# Patient Record
Sex: FEMALE | HISPANIC OR LATINO | URBAN - NONMETROPOLITAN AREA
[De-identification: names, ages, dates, MRNs, and addresses within clinical notes are randomized per-mention and may not be internally consistent; named-entity substitution may affect disease eponyms.]

---

## 2024-07-02 ENCOUNTER — APPOINTMENT (RX ONLY)
Dept: URBAN - NONMETROPOLITAN AREA CLINIC 9 | Facility: CLINIC | Age: 29
Setting detail: DERMATOLOGY
End: 2024-07-02

## 2024-07-02 DIAGNOSIS — L81.0 POSTINFLAMMATORY HYPERPIGMENTATION: ICD-10-CM

## 2024-07-02 DIAGNOSIS — L21.8 OTHER SEBORRHEIC DERMATITIS: ICD-10-CM

## 2024-07-02 PROCEDURE — ? COUNSELING

## 2024-07-02 PROCEDURE — ? INVENTORY

## 2024-07-02 PROCEDURE — ? PRESCRIPTION

## 2024-07-02 PROCEDURE — ? TREATMENT REGIMEN

## 2024-07-02 PROCEDURE — 99203 OFFICE O/P NEW LOW 30 MIN: CPT

## 2024-07-02 RX ORDER — CLOBETASOL PROPIONATE 0.5 MG/ML
SOLUTION TOPICAL
Qty: 50 | Refills: 1 | Status: ERX | COMMUNITY
Start: 2024-07-02

## 2024-07-02 RX ADMIN — CLOBETASOL PROPIONATE: 0.5 SOLUTION TOPICAL at 00:00

## 2024-07-02 ASSESSMENT — LOCATION SIMPLE DESCRIPTION DERM
LOCATION SIMPLE: RIGHT ANTERIOR NECK
LOCATION SIMPLE: POSTERIOR SCALP
LOCATION SIMPLE: LEFT CHEEK

## 2024-07-02 ASSESSMENT — LOCATION ZONE DERM
LOCATION ZONE: SCALP
LOCATION ZONE: FACE
LOCATION ZONE: NECK

## 2024-07-02 ASSESSMENT — LOCATION DETAILED DESCRIPTION DERM
LOCATION DETAILED: RIGHT OCCIPITAL SCALP
LOCATION DETAILED: RIGHT INFERIOR ANTERIOR NECK
LOCATION DETAILED: LEFT CENTRAL BUCCAL CHEEK
LOCATION DETAILED: LEFT OCCIPITAL SCALP

## 2024-07-02 NOTE — HPI: RASH
Is This A New Presentation, Or A Follow-Up?: Rash
Additional History: Rash started the end of May and lasted about 3 weeks. She went to [facility] a couple times and was prescribed steroid pills and cream, antibiotic pills and cream. Rash didn’t seem to improve with those therapies, but with time seemed to resolve. Now she has pigment changes of skin and would like improvement of that and the overall dryness of her face.
What Type Of Note Output Would You Prefer (Optional)?: Standard Output
Additional History: She was diagnosed with scalp psoriasis years ago. She uses Head and Shoulders Shampoo and Conditioner daily. She’s had Clobetasol Solution in the past which she would use 3 times a week, but is currently out and would like a refill.

## 2024-08-13 ENCOUNTER — APPOINTMENT (RX ONLY)
Dept: URBAN - NONMETROPOLITAN AREA CLINIC 9 | Facility: CLINIC | Age: 29
Setting detail: DERMATOLOGY
End: 2024-08-13

## 2024-08-13 DIAGNOSIS — L81.0 POSTINFLAMMATORY HYPERPIGMENTATION: ICD-10-CM

## 2024-08-13 DIAGNOSIS — L21.8 OTHER SEBORRHEIC DERMATITIS: ICD-10-CM

## 2024-08-13 PROCEDURE — ? COUNSELING

## 2024-08-13 PROCEDURE — 99213 OFFICE O/P EST LOW 20 MIN: CPT

## 2024-08-13 PROCEDURE — ? PRESCRIPTION

## 2024-08-13 PROCEDURE — ? TREATMENT REGIMEN

## 2024-08-13 RX ORDER — CLOBETASOL PROPIONATE 0.5 MG/ML
SOLUTION TOPICAL
Qty: 50 | Refills: 2 | Status: ERX

## 2024-08-13 ASSESSMENT — LOCATION ZONE DERM
LOCATION ZONE: NECK
LOCATION ZONE: FACE
LOCATION ZONE: SCALP

## 2024-08-13 ASSESSMENT — LOCATION SIMPLE DESCRIPTION DERM
LOCATION SIMPLE: RIGHT ANTERIOR NECK
LOCATION SIMPLE: POSTERIOR SCALP
LOCATION SIMPLE: LEFT CHEEK

## 2024-12-06 ENCOUNTER — APPOINTMENT (OUTPATIENT)
Dept: URBAN - NONMETROPOLITAN AREA CLINIC 9 | Facility: CLINIC | Age: 29
Setting detail: DERMATOLOGY
End: 2024-12-06

## 2024-12-06 DIAGNOSIS — L81.0 POSTINFLAMMATORY HYPERPIGMENTATION: ICD-10-CM

## 2024-12-06 DIAGNOSIS — B35.1 TINEA UNGUIUM: ICD-10-CM

## 2024-12-06 DIAGNOSIS — L21.8 OTHER SEBORRHEIC DERMATITIS: ICD-10-CM

## 2024-12-06 PROCEDURE — ? TREATMENT REGIMEN

## 2024-12-06 PROCEDURE — 99213 OFFICE O/P EST LOW 20 MIN: CPT

## 2024-12-06 PROCEDURE — ? INVENTORY

## 2024-12-06 PROCEDURE — ? COUNSELING

## 2024-12-06 PROCEDURE — ? IN-HOUSE DISPENSING PHARMACY

## 2024-12-06 ASSESSMENT — LOCATION SIMPLE DESCRIPTION DERM
LOCATION SIMPLE: POSTERIOR SCALP
LOCATION SIMPLE: RIGHT ANTERIOR NECK
LOCATION SIMPLE: LEFT CHEEK
LOCATION SIMPLE: LEFT GREAT TOE

## 2024-12-06 ASSESSMENT — LOCATION DETAILED DESCRIPTION DERM
LOCATION DETAILED: RIGHT OCCIPITAL SCALP
LOCATION DETAILED: LEFT CENTRAL BUCCAL CHEEK
LOCATION DETAILED: RIGHT INFERIOR ANTERIOR NECK
LOCATION DETAILED: LEFT DORSAL GREAT TOE
LOCATION DETAILED: LEFT OCCIPITAL SCALP

## 2024-12-06 ASSESSMENT — LOCATION ZONE DERM
LOCATION ZONE: NECK
LOCATION ZONE: FACE
LOCATION ZONE: SCALP
LOCATION ZONE: TOE

## 2024-12-06 NOTE — PROCEDURE: IN-HOUSE DISPENSING PHARMACY
Product 29 Price/Unit (In Dollars): 0
Product 31 Application Directions: Apply topically to the affected areas 1-2 times daily as directed.
Product 4 Unit Type: mg
Send Charges To Patient Encounter: No
Product 53 Amount/Unit (Numbers Only): 1
Name Of Product 11: Anti-Fungal Cream\\n(Ciclopirox 3%, Itraconazole 5%, Urea 20%, DMSO 5%)
Product 22 Price/Unit (In Dollars): 50
Product 2 Amount/Unit (Numbers Only): 120
Product 31 Unit Type: grams
Product 51 Application Directions: Brighten the skin and exfoliate. For anti-aging or acne prone skin. Use 2-4 nights a week after cleansing and before serums/creams. Rinse off.
Product 24 Application Directions: Patient is to mix with 14 ounces of Skin MD.\\nApply sparingly topically to the affected areas 5-10 times per week, taper as directed.
Product 51 Unit Type: unit(s)
Name Of Product 41: Alopecia Topical Solution for Men HP\\n(Minoxidil 7%, Finasteride 0.1%)
Name Of Product 32: Actinic Keratosis Gel / Wart Gel\\n(Imiquimod 5%, Niacinamide 2%, Levocetirizine Dihydrochloride 1%)
Render Refills If Set To 0: Yes
Product 11 Application Directions: Apply sparingly topically to the affected areas 1-2 times daily as directed.
Name Of Product 52: Antioxidant C Serum
Product 22 Amount/Unit (Numbers Only): 60
Name Of Product 25: Dermatitis Foam\\n(Clobetasol Propionate 0.05%, Calcipotriene 0.005%)
Name Of Product 1: Acne Gel\\n(Adapalene 0.3%, Benzoyl Peroxide 2.5%, Niacinamide 4%)
Product 41 Application Directions: Apply topically to the affected areas 1 or 2 times daily or as directed.
Product 32 Amount/Unit (Numbers Only): 30
Name Of Product 26: Psoriasis Cream\\n(Calcipotriene 0.005%, Niacinamide 4%)
Product 54 Unit Type: bottle(s)
Product 23 Application Directions: Apply sparingly topically to the affected areas 3 times per week, taper as directed.
Product 12 Amount/Unit (Numbers Only): 15
Product 26 Application Directions: Apply sparingly to all affected areas as directed 1-2 times per day.
Name Of Product 31: Wart Corn and Callus Cream\\n(Cimetidine 10%, Lidocaine 5%, Salicylic Acid 40%)
Name Of Product 51: AHA + BHA Peel Pads
Detail Level: Zone
Name Of Product 24: Dermatitis Topical Solution\\n(Clobetasol Propionate 0.05%, Niacinamide 4%)
Name Of Product 22: Dermatitis Cream\\n(Clobetasol Propionate 0.05%, Niacinamide 4%)
Product 53 Application Directions: Improves the appearance of dark spots, melasma, hyperpigmentation, sun damage. Use daily, morning or evening.
Product 2 Application Directions: Wash affected areas daily or as directed.
Name Of Product 54: Hyaluronic Acid Gel
Name Of Product 3: Rosacea Silicone Gel\\n(Metronidazole 1%, Ivermectin 1%, Potassium Azeloyl Diglycinate 5%, Niacinamide 4%)
Product 22 Application Directions: Apply sparingly topically to the affected areas 1-2 times a day M-F, taper as directed.
Name Of Product 23: Dermatitis Topical Solution\\n(Clobetasol Propionate 0.05%, Niacinamide 4%
Product 54 Application Directions: Oil-free moisturizing gel. Good for plumping skin with wrinkles, crepiness and uneven texture. Use daily, morning or evening.
Product 32 Application Directions: Apply sparingly topically to the affected areas daily as directed.
Name Of Product 12: Anti-Fungal Nail Solution\\n(Fluconazole 4%, Ibuprofen 2%, Itraconazole 1%, Terbinafine HCL 4%)
Product 1 Application Directions: Apply sparingly topically to acne affected zones daily as directed.
Name Of Product 21: Dermatitis Cream\\n(Fluocinolone Acetonide 0.01%, Niacinamide 4%)
Product 52 Application Directions: Brightens the skin adding a healthy glow. Protects from pollution and environmental damage. Use daily, morning or evening.
Product 25 Application Directions: Apply sparingly topically to the affected areas 1-2 times daily, taper as directed.
Product 12 Application Directions: Apply topically to the affected nail(s) and surrounding areas twice daily as directed.
Name Of Product 53: [place]
Name Of Product 2: Acne Body Wash\\n(Salicylic Acid 5%, Sodium Sulfacetamide Monohydrate 10%, Sulfur Precipitated 2.75%)

## 2024-12-06 NOTE — HPI: INFECTION (TINEA)
How Severe Is Your Dermatophytosis?: moderate
Is This A New Presentation, Or A Follow-Up?: Rash
Additional History: Patient had toenail fungus some years ago and was on pills.   She cleared up and thought the left great toenail would grow out normally but it is not and she is concerned that the fungus will spread.

## 2025-03-28 ENCOUNTER — APPOINTMENT (OUTPATIENT)
Dept: URBAN - NONMETROPOLITAN AREA CLINIC 9 | Facility: CLINIC | Age: 30
Setting detail: DERMATOLOGY
End: 2025-03-28

## 2025-03-28 DIAGNOSIS — B35.1 TINEA UNGUIUM: ICD-10-CM

## 2025-03-28 PROCEDURE — ? IN-HOUSE DISPENSING PHARMACY

## 2025-03-28 PROCEDURE — ? INVENTORY

## 2025-03-28 NOTE — PROCEDURE: IN-HOUSE DISPENSING PHARMACY
Product 51 Refills: 0
Detail Level: Zone
Product 10 Unit Type: mg
Product 53 Unit Type: bottle(s)
Send Charges To Patient Encounter: No
Product 22 Price/Unit (In Dollars): 50
Name Of Product 11: Anti-Fungal Cream\\n(Ciclopirox 3%, Itraconazole 5%, Urea 20%, DMSO 5%)
Product 31 Amount/Unit (Numbers Only): 30
Name Of Product 54: Hyaluronic Acid Gel
Product 2 Amount/Unit (Numbers Only): 120
Product 24 Application Directions: Patient is to mix with 14 ounces of Skin MD.\\nApply sparingly topically to the affected areas 5-10 times per week, taper as directed.
Product 24 Unit Type: grams
Render Refills If Set To 0: Yes
Product 22 Amount/Unit (Numbers Only): 60
Product 11 Application Directions: Apply sparingly topically to the affected areas 1-2 times daily as directed.
Name Of Product 25: Dermatitis Foam\\n(Clobetasol Propionate 0.05%, Calcipotriene 0.005%)
Name Of Product 1: Acne Gel\\n(Adapalene 0.3%, Benzoyl Peroxide 2.5%, Niacinamide 4%)
Product 54 Application Directions: Oil-free moisturizing gel. Good for plumping skin with wrinkles, crepiness and uneven texture. Use daily, morning or evening.
Name Of Product 23: Dermatitis Topical Solution\\n(Clobetasol Propionate 0.05%, Niacinamide 4%
Product 52 Application Directions: Brightens the skin adding a healthy glow. Protects from pollution and environmental damage. Use daily, morning or evening.
Product 32 Application Directions: Apply sparingly topically to the affected areas daily as directed.
Name Of Product 26: Psoriasis Cream\\n(Calcipotriene 0.005%, Niacinamide 4%)
Product 23 Application Directions: Apply sparingly topically to the affected areas 3 times per week, taper as directed.
Name Of Product 53: [place]
Product 26 Application Directions: Apply sparingly to all affected areas as directed 1-2 times per day.
Product 12 Amount/Unit (Numbers Only): 15
Name Of Product 24: Dermatitis Topical Solution\\n(Clobetasol Propionate 0.05%, Niacinamide 4%)
Product 53 Application Directions: Improves the appearance of dark spots, melasma, hyperpigmentation, sun damage. Use daily, morning or evening.
Product 53 Amount/Unit (Numbers Only): 1
Name Of Product 22: Dermatitis Cream\\n(Clobetasol Propionate 0.05%, Niacinamide 4%)
Product 51 Application Directions: Brighten the skin and exfoliate. For anti-aging or acne prone skin. Use 2-4 nights a week after cleansing and before serums/creams. Rinse off.
Product 31 Application Directions: Apply topically to the affected areas 1-2 times daily as directed.
Product 2 Application Directions: Wash affected areas daily or as directed.
Product 51 Unit Type: unit(s)
Product 22 Application Directions: Apply sparingly topically to the affected areas 1-2 times a day M-F, taper as directed.
Name Of Product 52: Antioxidant C Serum
Name Of Product 32: Actinic Keratosis Gel / Wart Gel\\n(Imiquimod 5%, Niacinamide 2%, Levocetirizine Dihydrochloride 1%)
Product 41 Application Directions: Apply topically to the affected areas 1 or 2 times daily or as directed.
Name Of Product 3: Rosacea Silicone Gel\\n(Metronidazole 1%, Ivermectin 1%, Potassium Azeloyl Diglycinate 5%, Niacinamide 4%)
Name Of Product 41: Alopecia Topical Solution for Men HP\\n(Minoxidil 7%, Finasteride 0.1%)
Name Of Product 21: Dermatitis Cream\\n(Fluocinolone Acetonide 0.01%, Niacinamide 4%)
Name Of Product 12: Anti-Fungal Nail Solution\\n(Fluconazole 4%, Ibuprofen 2%, Itraconazole 1%, Terbinafine HCL 4%)
Product 25 Application Directions: Apply sparingly topically to the affected areas 1-2 times daily, taper as directed.
Product 1 Application Directions: Apply sparingly topically to acne affected zones daily as directed.
Name Of Product 51: AHA + BHA Peel Pads
Product 12 Application Directions: Apply topically to the affected nail(s) and surrounding areas twice daily as directed.
Name Of Product 31: Wart Corn and Callus Cream\\n(Cimetidine 10%, Lidocaine 5%, Salicylic Acid 40%)
Name Of Product 2: Acne Body Wash\\n(Salicylic Acid 5%, Sodium Sulfacetamide Monohydrate 10%, Sulfur Precipitated 2.75%)

## 2025-04-21 ENCOUNTER — APPOINTMENT (OUTPATIENT)
Dept: URBAN - NONMETROPOLITAN AREA CLINIC 9 | Facility: CLINIC | Age: 30
Setting detail: DERMATOLOGY
End: 2025-04-21

## 2025-04-21 DIAGNOSIS — B35.1 TINEA UNGUIUM: ICD-10-CM

## 2025-04-21 PROCEDURE — ? IN-HOUSE DISPENSING PHARMACY

## 2025-04-21 PROCEDURE — ? INVENTORY

## 2025-04-21 NOTE — PROCEDURE: IN-HOUSE DISPENSING PHARMACY
Product 68 Amount/Unit (Numbers Only): 0
Name Of Product 23: Dermatitis Topical Solution\\n(Clobetasol Propionate 0.05%, Niacinamide 4%
Product 34 Unit Type: mg
Product 41 Amount/Unit (Numbers Only): 60
Product 54 Application Directions: Oil-free moisturizing gel. Good for plumping skin with wrinkles, crepiness and uneven texture. Use daily, morning or evening.
Name Of Product 2: Acne Body Wash\\n(Salicylic Acid 5%, Sodium Sulfacetamide Monohydrate 10%, Sulfur Precipitated 2.75%)
Product 32 Amount/Unit (Numbers Only): 30
Product 54 Unit Type: bottle(s)
Product 21 Price/Unit (In Dollars): 50
Product 52 Amount/Unit (Numbers Only): 1
Product 23 Application Directions: Apply sparingly topically to the affected areas 3 times per week, taper as directed.
Product 2 Application Directions: Wash affected areas daily or as directed.
Name Of Product 31: Wart Corn and Callus Cream\\n(Cimetidine 10%, Lidocaine 5%, Salicylic Acid 40%)
Product 23 Unit Type: grams
Product 12 Amount/Unit (Numbers Only): 15
Name Of Product 24: Dermatitis Topical Solution\\n(Clobetasol Propionate 0.05%, Niacinamide 4%)
Name Of Product 51: AHA + BHA Peel Pads
Render Refills If Set To 0: Yes
Product 26 Application Directions: Apply sparingly to all affected areas as directed 1-2 times per day.
Name Of Product 22: Dermatitis Cream\\n(Clobetasol Propionate 0.05%, Niacinamide 4%)
Product 53 Application Directions: Improves the appearance of dark spots, melasma, hyperpigmentation, sun damage. Use daily, morning or evening.
Name Of Product 1: Acne Gel\\n(Adapalene 0.3%, Benzoyl Peroxide 2.5%, Niacinamide 4%)
Name Of Product 54: Hyaluronic Acid Gel
Product 22 Application Directions: Apply sparingly topically to the affected areas 1-2 times a day M-F, taper as directed.
Product 1 Application Directions: Apply sparingly topically to acne affected zones daily as directed.
Product 41 Application Directions: Apply topically to the affected areas 1 or 2 times daily or as directed.
Product 32 Application Directions: Apply sparingly topically to the affected areas daily as directed.
Name Of Product 12: Anti-Fungal Nail Solution\\n(Fluconazole 4%, Ibuprofen 2%, Itraconazole 1%, Terbinafine HCL 4%)
Name Of Product 21: Dermatitis Cream\\n(Fluocinolone Acetonide 0.01%, Niacinamide 4%)
Product 25 Application Directions: Apply sparingly topically to the affected areas 1-2 times daily, taper as directed.
Product 52 Application Directions: Brightens the skin adding a healthy glow. Protects from pollution and environmental damage. Use daily, morning or evening.
Detail Level: Zone
Send Charges To Patient Encounter: No
Name Of Product 53: [place]
Product 12 Application Directions: Apply topically to the affected nail(s) and surrounding areas twice daily as directed.
Name Of Product 11: Anti-Fungal Cream\\n(Ciclopirox 3%, Itraconazole 5%, Urea 20%, DMSO 5%)
Product 2 Amount/Unit (Numbers Only): 120
Name Of Product 26: Psoriasis Cream\\n(Calcipotriene 0.005%, Niacinamide 4%)
Name Of Product 3: Rosacea Silicone Gel\\n(Metronidazole 1%, Ivermectin 1%, Potassium Azeloyl Diglycinate 5%, Niacinamide 4%)
Product 31 Application Directions: Apply topically to the affected areas 1-2 times daily as directed.
Product 51 Application Directions: Brighten the skin and exfoliate. For anti-aging or acne prone skin. Use 2-4 nights a week after cleansing and before serums/creams. Rinse off.
Product 24 Application Directions: Patient is to mix with 14 ounces of Skin MD.\\nApply sparingly topically to the affected areas 5-10 times per week, taper as directed.
Name Of Product 41: Alopecia Topical Solution for Men HP\\n(Minoxidil 7%, Finasteride 0.1%)
Name Of Product 32: Actinic Keratosis Gel / Wart Gel\\n(Imiquimod 5%, Niacinamide 2%, Levocetirizine Dihydrochloride 1%)
Product 51 Unit Type: unit(s)
Product 3 Application Directions: Apply sparingly topically to the affected areas 1-2 times daily as directed.
Name Of Product 52: Antioxidant C Serum
Name Of Product 25: Dermatitis Foam\\n(Clobetasol Propionate 0.05%, Calcipotriene 0.005%)

## 2025-06-24 ENCOUNTER — APPOINTMENT (OUTPATIENT)
Dept: URBAN - NONMETROPOLITAN AREA CLINIC 9 | Facility: CLINIC | Age: 30
Setting detail: DERMATOLOGY
End: 2025-06-24

## 2025-06-24 DIAGNOSIS — L21.8 OTHER SEBORRHEIC DERMATITIS: ICD-10-CM

## 2025-06-24 DIAGNOSIS — B35.1 TINEA UNGUIUM: ICD-10-CM

## 2025-06-24 DIAGNOSIS — L81.0 POSTINFLAMMATORY HYPERPIGMENTATION: ICD-10-CM

## 2025-06-24 PROCEDURE — ? TREATMENT REGIMEN

## 2025-06-24 PROCEDURE — ? PRESCRIPTION

## 2025-06-24 PROCEDURE — ? COUNSELING

## 2025-06-24 RX ORDER — CLOBETASOL PROPIONATE 0.5 MG/ML
SOLUTION TOPICAL
Qty: 50 | Refills: 2 | Status: ERX

## 2025-06-24 ASSESSMENT — LOCATION SIMPLE DESCRIPTION DERM
LOCATION SIMPLE: LEFT CHEEK
LOCATION SIMPLE: RIGHT ANTERIOR NECK
LOCATION SIMPLE: LEFT GREAT TOE
LOCATION SIMPLE: POSTERIOR SCALP

## 2025-06-24 ASSESSMENT — LOCATION ZONE DERM
LOCATION ZONE: SCALP
LOCATION ZONE: NECK
LOCATION ZONE: FACE
LOCATION ZONE: TOE

## 2025-06-24 ASSESSMENT — LOCATION DETAILED DESCRIPTION DERM
LOCATION DETAILED: RIGHT INFERIOR ANTERIOR NECK
LOCATION DETAILED: LEFT CENTRAL BUCCAL CHEEK
LOCATION DETAILED: LEFT OCCIPITAL SCALP
LOCATION DETAILED: RIGHT OCCIPITAL SCALP
LOCATION DETAILED: LEFT DORSAL GREAT TOE